# Patient Record
Sex: FEMALE | Race: WHITE | NOT HISPANIC OR LATINO | URBAN - METROPOLITAN AREA
[De-identification: names, ages, dates, MRNs, and addresses within clinical notes are randomized per-mention and may not be internally consistent; named-entity substitution may affect disease eponyms.]

---

## 2023-08-16 ENCOUNTER — EMERGENCY (EMERGENCY)
Facility: HOSPITAL | Age: 53
LOS: 1 days | Discharge: HOME | End: 2023-08-16
Attending: EMERGENCY MEDICINE
Payer: COMMERCIAL

## 2023-08-16 VITALS
DIASTOLIC BLOOD PRESSURE: 70 MMHG | RESPIRATION RATE: 18 BRPM | OXYGEN SATURATION: 97 % | HEART RATE: 59 BPM | SYSTOLIC BLOOD PRESSURE: 106 MMHG | TEMPERATURE: 98 F

## 2023-08-16 VITALS
DIASTOLIC BLOOD PRESSURE: 66 MMHG | RESPIRATION RATE: 18 BRPM | TEMPERATURE: 98 F | SYSTOLIC BLOOD PRESSURE: 112 MMHG | OXYGEN SATURATION: 96 % | WEIGHT: 164.02 LBS | HEART RATE: 72 BPM

## 2023-08-16 LAB
ALBUMIN SERPL ELPH-MCNC: 4.2 G/DL — SIGNIFICANT CHANGE UP (ref 3.3–5.2)
ALP SERPL-CCNC: 59 U/L — SIGNIFICANT CHANGE UP (ref 40–120)
ALT FLD-CCNC: 12 U/L — SIGNIFICANT CHANGE UP
ANION GAP SERPL CALC-SCNC: 12 MMOL/L — SIGNIFICANT CHANGE UP (ref 5–17)
APPEARANCE UR: ABNORMAL
AST SERPL-CCNC: 18 U/L — SIGNIFICANT CHANGE UP
BACTERIA # UR AUTO: ABNORMAL
BASOPHILS # BLD AUTO: 0.03 K/UL — SIGNIFICANT CHANGE UP (ref 0–0.2)
BASOPHILS NFR BLD AUTO: 0.2 % — SIGNIFICANT CHANGE UP (ref 0–2)
BILIRUB SERPL-MCNC: 0.4 MG/DL — SIGNIFICANT CHANGE UP (ref 0.4–2)
BILIRUB UR-MCNC: NEGATIVE — SIGNIFICANT CHANGE UP
BUN SERPL-MCNC: 17.8 MG/DL — SIGNIFICANT CHANGE UP (ref 8–20)
CALCIUM SERPL-MCNC: 9.9 MG/DL — SIGNIFICANT CHANGE UP (ref 8.4–10.5)
CHLORIDE SERPL-SCNC: 104 MMOL/L — SIGNIFICANT CHANGE UP (ref 96–108)
CO2 SERPL-SCNC: 25 MMOL/L — SIGNIFICANT CHANGE UP (ref 22–29)
COLOR SPEC: YELLOW — SIGNIFICANT CHANGE UP
CREAT SERPL-MCNC: 1.16 MG/DL — SIGNIFICANT CHANGE UP (ref 0.5–1.3)
DIFF PNL FLD: ABNORMAL
EGFR: 57 ML/MIN/1.73M2 — LOW
EOSINOPHIL # BLD AUTO: 0 K/UL — SIGNIFICANT CHANGE UP (ref 0–0.5)
EOSINOPHIL NFR BLD AUTO: 0 % — SIGNIFICANT CHANGE UP (ref 0–6)
EPI CELLS # UR: SIGNIFICANT CHANGE UP
GLUCOSE SERPL-MCNC: 122 MG/DL — HIGH (ref 70–99)
GLUCOSE UR QL: NEGATIVE MG/DL — SIGNIFICANT CHANGE UP
HCG SERPL-ACNC: <4 MIU/ML — SIGNIFICANT CHANGE UP
HCT VFR BLD CALC: 39.8 % — SIGNIFICANT CHANGE UP (ref 34.5–45)
HGB BLD-MCNC: 13.8 G/DL — SIGNIFICANT CHANGE UP (ref 11.5–15.5)
IMM GRANULOCYTES NFR BLD AUTO: 0.4 % — SIGNIFICANT CHANGE UP (ref 0–0.9)
KETONES UR-MCNC: NEGATIVE — SIGNIFICANT CHANGE UP
LEUKOCYTE ESTERASE UR-ACNC: ABNORMAL
LYMPHOCYTES # BLD AUTO: 1.6 K/UL — SIGNIFICANT CHANGE UP (ref 1–3.3)
LYMPHOCYTES # BLD AUTO: 11.4 % — LOW (ref 13–44)
MCHC RBC-ENTMCNC: 30.6 PG — SIGNIFICANT CHANGE UP (ref 27–34)
MCHC RBC-ENTMCNC: 34.7 GM/DL — SIGNIFICANT CHANGE UP (ref 32–36)
MCV RBC AUTO: 88.2 FL — SIGNIFICANT CHANGE UP (ref 80–100)
MONOCYTES # BLD AUTO: 0.51 K/UL — SIGNIFICANT CHANGE UP (ref 0–0.9)
MONOCYTES NFR BLD AUTO: 3.6 % — SIGNIFICANT CHANGE UP (ref 2–14)
NEUTROPHILS # BLD AUTO: 11.78 K/UL — HIGH (ref 1.8–7.4)
NEUTROPHILS NFR BLD AUTO: 84.4 % — HIGH (ref 43–77)
NITRITE UR-MCNC: NEGATIVE — SIGNIFICANT CHANGE UP
PH UR: 6 — SIGNIFICANT CHANGE UP (ref 5–8)
PLATELET # BLD AUTO: 268 K/UL — SIGNIFICANT CHANGE UP (ref 150–400)
POTASSIUM SERPL-MCNC: 4.5 MMOL/L — SIGNIFICANT CHANGE UP (ref 3.5–5.3)
POTASSIUM SERPL-SCNC: 4.5 MMOL/L — SIGNIFICANT CHANGE UP (ref 3.5–5.3)
PROT SERPL-MCNC: 6.5 G/DL — LOW (ref 6.6–8.7)
PROT UR-MCNC: 100
RBC # BLD: 4.51 M/UL — SIGNIFICANT CHANGE UP (ref 3.8–5.2)
RBC # FLD: 12.5 % — SIGNIFICANT CHANGE UP (ref 10.3–14.5)
RBC CASTS # UR COMP ASSIST: ABNORMAL /HPF (ref 0–4)
SODIUM SERPL-SCNC: 141 MMOL/L — SIGNIFICANT CHANGE UP (ref 135–145)
SP GR SPEC: 1.02 — SIGNIFICANT CHANGE UP (ref 1.01–1.02)
UROBILINOGEN FLD QL: 1 MG/DL
WBC # BLD: 13.98 K/UL — HIGH (ref 3.8–10.5)
WBC # FLD AUTO: 13.98 K/UL — HIGH (ref 3.8–10.5)
WBC UR QL: SIGNIFICANT CHANGE UP /HPF (ref 0–5)

## 2023-08-16 PROCEDURE — 87086 URINE CULTURE/COLONY COUNT: CPT

## 2023-08-16 PROCEDURE — 96375 TX/PRO/DX INJ NEW DRUG ADDON: CPT

## 2023-08-16 PROCEDURE — 81001 URINALYSIS AUTO W/SCOPE: CPT

## 2023-08-16 PROCEDURE — 74176 CT ABD & PELVIS W/O CONTRAST: CPT | Mod: MA

## 2023-08-16 PROCEDURE — 99284 EMERGENCY DEPT VISIT MOD MDM: CPT | Mod: 25

## 2023-08-16 PROCEDURE — 74176 CT ABD & PELVIS W/O CONTRAST: CPT | Mod: 26,MA

## 2023-08-16 PROCEDURE — 87040 BLOOD CULTURE FOR BACTERIA: CPT

## 2023-08-16 PROCEDURE — 85025 COMPLETE CBC W/AUTO DIFF WBC: CPT

## 2023-08-16 PROCEDURE — 99285 EMERGENCY DEPT VISIT HI MDM: CPT

## 2023-08-16 PROCEDURE — 84702 CHORIONIC GONADOTROPIN TEST: CPT

## 2023-08-16 PROCEDURE — 36415 COLL VENOUS BLD VENIPUNCTURE: CPT

## 2023-08-16 PROCEDURE — 96374 THER/PROPH/DIAG INJ IV PUSH: CPT

## 2023-08-16 PROCEDURE — 80053 COMPREHEN METABOLIC PANEL: CPT

## 2023-08-16 RX ORDER — ONDANSETRON 8 MG/1
1 TABLET, FILM COATED ORAL
Qty: 1 | Refills: 0
Start: 2023-08-16

## 2023-08-16 RX ORDER — SODIUM CHLORIDE 9 MG/ML
1000 INJECTION INTRAMUSCULAR; INTRAVENOUS; SUBCUTANEOUS ONCE
Refills: 0 | Status: COMPLETED | OUTPATIENT
Start: 2023-08-16 | End: 2023-08-16

## 2023-08-16 RX ORDER — CEFTRIAXONE 500 MG/1
1000 INJECTION, POWDER, FOR SOLUTION INTRAMUSCULAR; INTRAVENOUS ONCE
Refills: 0 | Status: DISCONTINUED | OUTPATIENT
Start: 2023-08-16 | End: 2023-08-16

## 2023-08-16 RX ORDER — MORPHINE SULFATE 50 MG/1
4 CAPSULE, EXTENDED RELEASE ORAL ONCE
Refills: 0 | Status: DISCONTINUED | OUTPATIENT
Start: 2023-08-16 | End: 2023-08-16

## 2023-08-16 RX ORDER — IBUPROFEN 200 MG
1 TABLET ORAL
Qty: 30 | Refills: 0
Start: 2023-08-16

## 2023-08-16 RX ORDER — KETOROLAC TROMETHAMINE 30 MG/ML
15 SYRINGE (ML) INJECTION ONCE
Refills: 0 | Status: DISCONTINUED | OUTPATIENT
Start: 2023-08-16 | End: 2023-08-16

## 2023-08-16 RX ORDER — ONDANSETRON 8 MG/1
4 TABLET, FILM COATED ORAL ONCE
Refills: 0 | Status: COMPLETED | OUTPATIENT
Start: 2023-08-16 | End: 2023-08-16

## 2023-08-16 RX ORDER — CEFPODOXIME PROXETIL 100 MG
1 TABLET ORAL
Qty: 10 | Refills: 0
Start: 2023-08-16

## 2023-08-16 RX ORDER — OXYCODONE AND ACETAMINOPHEN 5; 325 MG/1; MG/1
1 TABLET ORAL
Qty: 15 | Refills: 0
Start: 2023-08-16

## 2023-08-16 RX ORDER — TAMSULOSIN HYDROCHLORIDE 0.4 MG/1
1 CAPSULE ORAL
Qty: 7 | Refills: 0
Start: 2023-08-16

## 2023-08-16 RX ORDER — CEFTRIAXONE 500 MG/1
1000 INJECTION, POWDER, FOR SOLUTION INTRAMUSCULAR; INTRAVENOUS ONCE
Refills: 0 | Status: DISCONTINUED | OUTPATIENT
Start: 2023-08-16 | End: 2023-08-23

## 2023-08-16 RX ADMIN — SODIUM CHLORIDE 1000 MILLILITER(S): 9 INJECTION INTRAMUSCULAR; INTRAVENOUS; SUBCUTANEOUS at 08:46

## 2023-08-16 RX ADMIN — SODIUM CHLORIDE 1000 MILLILITER(S): 9 INJECTION INTRAMUSCULAR; INTRAVENOUS; SUBCUTANEOUS at 05:49

## 2023-08-16 RX ADMIN — ONDANSETRON 4 MILLIGRAM(S): 8 TABLET, FILM COATED ORAL at 05:48

## 2023-08-16 RX ADMIN — MORPHINE SULFATE 4 MILLIGRAM(S): 50 CAPSULE, EXTENDED RELEASE ORAL at 05:50

## 2023-08-16 RX ADMIN — Medication 15 MILLIGRAM(S): at 06:44

## 2023-08-16 RX ADMIN — Medication 15 MILLIGRAM(S): at 07:10

## 2023-08-16 NOTE — ED PROVIDER NOTE - PROGRESS NOTE DETAILS
MD Rigoberto:  Received signout from Dr. Peters pending CT. found to have 7mm stone with moderate hydro, wbc 13, afebrile,  BP in high 90s/60s.  given ivf and abx. ua with only few WBCs and large RBCs.  moderate bacteria.  spoke with urology regarding case and they state patient may be discharged home with strict return precautions. offered obs for patient, but she prefers trial of outpatient therapy. will dc with abx and pain control.

## 2023-08-16 NOTE — ED PROVIDER NOTE - PATIENT PORTAL LINK FT
You can access the FollowMyHealth Patient Portal offered by [facility] by registering at the following website: [email or web address]. By joining [facility]’s FollowMyHealth portal, you will also be able to view your health information using other applications (apps) compatible with our system.

## 2023-08-16 NOTE — ED ADULT NURSE NOTE - NS ED NOTE  TALK SOMEONE YN
56F with PMH including HTN p/w headaches s/p minor head trauma yesterday. No LOC or any other injuries or symptoms. Pt well appearing, neuro intact, no external signs of trauma. Low suspicion for serious intracranial trauma but will screen with CTH and reassess after Tylenol.
No

## 2023-08-16 NOTE — ED ADULT NURSE NOTE - OBJECTIVE STATEMENT
pt to ED with complaints of left flank pain. denies pmh. pt states around 2200 she developed sudden onset of left flank pain after taking two flights last night. pt states pain is sharp 10/10 in left flank and radiates to her abdomen. pt with multiple episodes on non bloody emesis. denies diarrhea. pt states she took tylenol at home PTA. pt denies any other urinary symptoms, sob, cp. pt presents A+O x4. RR even and unlabored.

## 2023-08-16 NOTE — ED ADULT NURSE REASSESSMENT NOTE - NS ED NURSE REASSESS COMMENT FT1
assumed care of pt from previous RN LP. Pt a&ox3, denies any pain or discomfort at this time. Pt states + relief from pain medication and states she is able to sleep now. VSS as charted, RR wnl. awaiting CT at this time, bed locked in lowest position, safety maintained.

## 2023-08-16 NOTE — ED ADULT TRIAGE NOTE - CHIEF COMPLAINT QUOTE
c/o of left side abd pain that goes to left lower back since 10 pm last night. c/o pain, N/V, Pt was here earlier but left right after being triage and just woke up in severe pain. Pt was on a plane yesterday coming back from [place]. Took 2 extra strength tylenol but no relief.

## 2023-08-16 NOTE — ED PROVIDER NOTE - NS ED MD DISPO DISCHARGE CCDA
Pathology report reviewed with breast radiologist Scott. I phoned and informed patient of results showing benign complex fibroadenoma. Recommended follow up is routine screening.   Patient states no problems with biopsy site. Questions were answered and my phone number given if she has further questions or concerns.  
Patient/Caregiver provided printed discharge information.

## 2023-08-16 NOTE — ED PROVIDER NOTE - PHYSICAL EXAMINATION
General: In NAD, non-toxic/well-appearing.  Skin: No rashes or lesions. Warm, dry, color normal for race.   Head: Normocephalic/atraumatic.   Eyes: Sclera anicteric, conjunctivae clear b/l. PERRLA, EOMI.   Neck: Supple, FROM.   Cardio: Rate and rhythm regular. No audible murmur.  Resp: Breath sounds vesicular, symmetrical and without rales, rhonchi or wheezing b/l.  Abd: Non-distended. Soft, non-tender. No rebound, +Left sided CVAT.   MSK: MAEx4. FROM.   Neuro: A&Ox3. Appears nonfocal. General: In NAD, non-toxic/well-appearing.  Skin: No rashes or lesions. Warm, dry, color normal for race.   Head: Normocephalic/atraumatic.   Eyes: Sclera anicteric, conjunctivae clear b/l. PERRLA, EOMI.   Neck: Supple, FROM.   Cardio: Rate and rhythm regular. No audible murmur.  Resp: Breath sounds vesicular, symmetrical and without rales, rhonchi or wheezing b/l.  Abd: Non-distended. Soft, non-tender. No rebound, or guarding. +Left sided CVAT.   MSK: MAEx4. FROM.   Neuro: A&Ox3. Appears nonfocal. Normal gait.

## 2023-08-16 NOTE — ED PROVIDER NOTE - ATTENDING APP SHARED VISIT CONTRIBUTION OF CARE
I agree with the PA's note and was available for any issues/concerns. I was directly involved in patient care. My brief overall assessment is as follows:     Pt with flank pain, labs and urine consistent with likely nephrolithiasis. no infectious sx. no UTI. CT pending     Patient signed out to incoming physician pending results of testing and reassessment.  All decisions regarding the progression of care will be made at their discretion.

## 2023-08-16 NOTE — ED PROVIDER NOTE - CARE PROVIDER_API CALL
Roberth [facility]keon [facility]  Urology  [address] [place] [id number]  Phone: [phone]  Fax: [phone]  Follow Up Time:

## 2023-08-16 NOTE — ED PROVIDER NOTE - OBJECTIVE STATEMENT
51 yo female no PMHx presents to ED c/o abdominal pain. Pain intermittent to left lower side and flank. Began while on airplane yesterday evening. Medicated with acetaminophen 4 hours ago. Presented to ED several hours ago, felt better and left prior to triage. Woke up again with severe pain. +Nausea without vomiting. Had two episodes of hematuria last week. No further complaints at this time.  Denies h/o kidney stones, fevers, chest pain, SOB.

## 2023-08-16 NOTE — ED PROVIDER NOTE - NSFOLLOWUPINSTRUCTIONS_ED_ALL_ED_FT
- Prescription sent to pharmacy.  - Ibuprofen 600mg every 6 hours as needed for pain.  - Acetaminophen 650mg every 6 hours as needed for pain.   - Increase fluids.   - Please call tomorrow to schedule follow up appointment with urologist.  - Please bring all documentation from your ED visit to any related future follow up appointment.  - Please call to schedule follow up appointment with your primary care physician within 24-48 hours.  - Please seek immediate medical attention for any new/worsening, signs/symptoms, or concerns including but not limited to fever, intractable pain/vomiting.    Feel better!       Kidney Stones       Kidney stones are solid, rock-like deposits that form inside of the kidneys. The kidneys are a pair of organs that make urine. A kidney stone may form in a kidney and move into other parts of the urinary tract, including the tubes that connect the kidneys to the bladder (ureters), the bladder, and the tube that carries urine out of the body (urethra). As the stone moves through these areas, it can cause intense pain and block the flow of urine.    Kidney stones are created when high levels of certain minerals are found in the urine. The stones are usually passed out of the body through urination, but in some cases, medical treatment may be needed to remove them.      What are the causes?  Kidney stones may be caused by:  •A condition in which certain glands produce too much parathyroid hormone (primary hyperparathyroidism), which causes too much calcium buildup in the blood.      •A buildup of uric acid crystals in the bladder (hyperuricosuria). Uric acid is a chemical that the body produces when you eat certain foods. It usually exits the body in the urine.      •Narrowing (stricture) of one or both of the ureters.      •A kidney blockage that is present at birth (congenital obstruction).      •Past surgery on the kidney or the ureters, such as gastric bypass surgery.        What increases the risk?  The following factors may make you more likely to develop this condition:  •Having had a kidney stone in the past.      •Having a family history of kidney stones.      •Not drinking enough water.      •Eating a diet that is high in protein, salt (sodium), or sugar.      •Being overweight or obese.        What are the signs or symptoms?  Symptoms of a kidney stone may include:  •Pain in the side of the abdomen, right below the ribs (flank pain). Pain usually spreads (radiates) to the groin.      •Needing to urinate frequently or urgently.      •Painful urination.      •Blood in the urine (hematuria).      •Nausea.      •Vomiting.      •Fever and chills.        How is this diagnosed?  This condition may be diagnosed based on:  •Your symptoms and medical history.      •A physical exam.      •Blood tests.      •Urine tests. These may be done before and after the stone passes out of your body through urination.      •Imaging tests, such as a CT scan, abdominal X-ray, or ultrasound.      •A procedure to examine the inside of the bladder (cystoscopy).        How is this treated?  Treatment for kidney stones depends on the size, location, and makeup of the stones. Kidney stones will often pass out of the body through urination. You may need to:  •Increase your fluid intake to help pass the stone. In some cases, you may be given fluids through an IV and may need to be monitored at the hospital.      •Take medicine for pain.      •Make changes in your diet to help prevent kidney stones from coming back.    Sometimes, medical procedures are needed to remove a kidney stone. This may involve:•A procedure to break up kidney stones using:  •A focused beam of light (laser therapy).      •Shock waves (extracorporeal shock wave lithotripsy).        •Surgery to remove kidney stones. This may be needed if you have severe pain or have stones that block your urinary tract.        Follow these instructions at home:    Medicines     •Take over-the-counter and prescription medicines only as told by your health care provider.      •Ask your health care provider if the medicine prescribed to you requires you to avoid driving or using heavy machinery.      Eating and drinking     •Drink enough fluid to keep your urine pale yellow. You may be instructed to drink at least 8–10 glasses of water each day. This will help you pass the kidney stone.    •If directed, change your diet. This may include:  •Limiting how much sodium you eat.      •Eating more fruits and vegetables.      •Limiting how much animal protein—such as red meat, poultry, fish, and eggs—you eat.        •Follow instructions from your health care provider about eating or drinking restrictions.      General instructions   •Collect urine samples as told by your health care provider. You may need to collect a urine sample:  •24 hours after you pass the stone.      •8–12 weeks after passing the kidney stone, and every 6–12 months after that.        •Strain your urine every time you urinate, for as long as directed. Use the strainer that your health care provider recommends.      • Do not throw out the kidney stone after passing it. Keep the stone so it can be tested by your health care provider. Testing the makeup of your kidney stone may help prevent you from getting kidney stones in the future.      •Keep all follow-up visits as told by your health care provider. This is important. You may need follow-up X-rays or ultrasounds to make sure that your stone has passed.        How is this prevented?  To prevent another kidney stone:  •Drink enough fluid to keep your urine pale yellow. This is the best way to prevent kidney stones.      •Eat a healthy diet and follow recommendations from your health care provider about foods to avoid. You may be instructed to eat a low-protein diet. Recommendations vary depending on the type of kidney stone that you have.      •Maintain a healthy weight.        Where to find more information    •National Kidney Foundation (NKF): www.kidney.org      •[employer] (UCF): www.urologyhealth.org        Contact a health care provider if:    •You have pain that gets worse or does not get better with medicine.        Get help right away if:    •You have a fever or chills.      •You develop severe pain.      •You develop new abdominal pain.      •You faint.      •You are unable to urinate.        Summary    •Kidney stones are solid, rock-like deposits that form inside of the kidneys.      •Kidney stones can cause nausea, vomiting, blood in the urine, abdominal pain, and the urge to urinate frequently.      •Treatment for kidney stones depends on the size, location, and makeup of the stones. Kidney stones will often pass out of the body through urination.      •Kidney stones can be prevented by drinking enough fluids, eating a healthy diet, and maintaining a healthy weight.      This information is not intended to replace advice given to you by your health care provider. Make sure you discuss any questions you have with your health care provider. - Prescription sent to pharmacy.  - Ibuprofen 600mg every 6 hours as needed for pain.  - Acetaminophen 650mg every 6 hours as needed for pain.   - Increase fluids.   - Please call tomorrow to schedule follow up appointment with urologist.  - Please bring all documentation from your ED visit to any related future follow up appointment.  - Please call to schedule follow up appointment with your primary care physician within 24-48 hours.  - Please seek immediate medical attention for any new/worsening, signs/symptoms, or concerns including but not limited to fever, intractable pain/vomiting.        Kidney Stones       Kidney stones are solid, rock-like deposits that form inside of the kidneys. The kidneys are a pair of organs that make urine. A kidney stone may form in a kidney and move into other parts of the urinary tract, including the tubes that connect the kidneys to the bladder (ureters), the bladder, and the tube that carries urine out of the body (urethra). As the stone moves through these areas, it can cause intense pain and block the flow of urine.    Kidney stones are created when high levels of certain minerals are found in the urine. The stones are usually passed out of the body through urination, but in some cases, medical treatment may be needed to remove them.      What are the causes?  Kidney stones may be caused by:  •A condition in which certain glands produce too much parathyroid hormone (primary hyperparathyroidism), which causes too much calcium buildup in the blood.      •A buildup of uric acid crystals in the bladder (hyperuricosuria). Uric acid is a chemical that the body produces when you eat certain foods. It usually exits the body in the urine.      •Narrowing (stricture) of one or both of the ureters.      •A kidney blockage that is present at birth (congenital obstruction).      •Past surgery on the kidney or the ureters, such as gastric bypass surgery.        What increases the risk?  The following factors may make you more likely to develop this condition:  •Having had a kidney stone in the past.      •Having a family history of kidney stones.      •Not drinking enough water.      •Eating a diet that is high in protein, salt (sodium), or sugar.      •Being overweight or obese.        What are the signs or symptoms?  Symptoms of a kidney stone may include:  •Pain in the side of the abdomen, right below the ribs (flank pain). Pain usually spreads (radiates) to the groin.      •Needing to urinate frequently or urgently.      •Painful urination.      •Blood in the urine (hematuria).      •Nausea.      •Vomiting.      •Fever and chills.        How is this diagnosed?  This condition may be diagnosed based on:  •Your symptoms and medical history.      •A physical exam.      •Blood tests.      •Urine tests. These may be done before and after the stone passes out of your body through urination.      •Imaging tests, such as a CT scan, abdominal X-ray, or ultrasound.      •A procedure to examine the inside of the bladder (cystoscopy).        How is this treated?  Treatment for kidney stones depends on the size, location, and makeup of the stones. Kidney stones will often pass out of the body through urination. You may need to:  •Increase your fluid intake to help pass the stone. In some cases, you may be given fluids through an IV and may need to be monitored at the hospital.      •Take medicine for pain.      •Make changes in your diet to help prevent kidney stones from coming back.    Sometimes, medical procedures are needed to remove a kidney stone. This may involve:•A procedure to break up kidney stones using:  •A focused beam of light (laser therapy).      •Shock waves (extracorporeal shock wave lithotripsy).        •Surgery to remove kidney stones. This may be needed if you have severe pain or have stones that block your urinary tract.        Follow these instructions at home:    Medicines     •Take over-the-counter and prescription medicines only as told by your health care provider.      •Ask your health care provider if the medicine prescribed to you requires you to avoid driving or using heavy machinery.      Eating and drinking     •Drink enough fluid to keep your urine pale yellow. You may be instructed to drink at least 8–10 glasses of water each day. This will help you pass the kidney stone.    •If directed, change your diet. This may include:  •Limiting how much sodium you eat.      •Eating more fruits and vegetables.      •Limiting how much animal protein—such as red meat, poultry, fish, and eggs—you eat.        •Follow instructions from your health care provider about eating or drinking restrictions.      General instructions   •Collect urine samples as told by your health care provider. You may need to collect a urine sample:  •24 hours after you pass the stone.      •8–12 weeks after passing the kidney stone, and every 6–12 months after that.        •Strain your urine every time you urinate, for as long as directed. Use the strainer that your health care provider recommends.      • Do not throw out the kidney stone after passing it. Keep the stone so it can be tested by your health care provider. Testing the makeup of your kidney stone may help prevent you from getting kidney stones in the future.      •Keep all follow-up visits as told by your health care provider. This is important. You may need follow-up X-rays or ultrasounds to make sure that your stone has passed.        How is this prevented?  To prevent another kidney stone:  •Drink enough fluid to keep your urine pale yellow. This is the best way to prevent kidney stones.      •Eat a healthy diet and follow recommendations from your health care provider about foods to avoid. You may be instructed to eat a low-protein diet. Recommendations vary depending on the type of kidney stone that you have.      •Maintain a healthy weight.        Where to find more information    •National Kidney Foundation (NKF): www.kidney.org      •[employer] (UCF): www.urologyhealth.org        Contact a health care provider if:    •You have pain that gets worse or does not get better with medicine.        Get help right away if:    •You have a fever or chills.      •You develop severe pain.      •You develop new abdominal pain.      •You faint.      •You are unable to urinate.        Summary    •Kidney stones are solid, rock-like deposits that form inside of the kidneys.      •Kidney stones can cause nausea, vomiting, blood in the urine, abdominal pain, and the urge to urinate frequently.      •Treatment for kidney stones depends on the size, location, and makeup of the stones. Kidney stones will often pass out of the body through urination.      •Kidney stones can be prevented by drinking enough fluids, eating a healthy diet, and maintaining a healthy weight.      This information is not intended to replace advice given to you by your health care provider. Make sure you discuss any questions you have with your health care provider.

## 2023-08-16 NOTE — ED ADULT NURSE NOTE - NSFALLRISKINTERV_ED_ALL_ED

## 2023-08-16 NOTE — ED PROVIDER NOTE - CLINICAL SUMMARY MEDICAL DECISION MAKING FREE TEXT BOX
53 yo female no PMHx presents to ED c/o intermittent left sided flank pain, nausea today and hematuria last week. Concerning for kidney stone. Mild leukocytosis, hematuria. Patient signed out to AM team pending CT. 51 yo female no PMHx presents to ED c/o intermittent left sided flank pain, nausea today and hematuria last week. Concerning for kidney stone. Mild leukocytosis, hematuria. Patient signed out to AM team pending CT.      Clinical Summary:  On CT, patient found to have moderate left-sided hydroureteronephrosis secondary to 7 mm calculus at   the UVJ.  +bacteria on UA with no nitrites and few wbc. will place on abx.  offered observation stay, but patient prefers to trial outpatient meds.  will dc with strict return precautions.

## 2023-08-17 LAB
CULTURE RESULTS: SIGNIFICANT CHANGE UP
SPECIMEN SOURCE: SIGNIFICANT CHANGE UP

## 2023-08-21 LAB
CULTURE RESULTS: SIGNIFICANT CHANGE UP
CULTURE RESULTS: SIGNIFICANT CHANGE UP
SPECIMEN SOURCE: SIGNIFICANT CHANGE UP
SPECIMEN SOURCE: SIGNIFICANT CHANGE UP

## 2023-08-22 PROBLEM — Z00.00 ENCOUNTER FOR PREVENTIVE HEALTH EXAMINATION: Status: ACTIVE | Noted: 2023-08-22

## 2023-08-23 ENCOUNTER — APPOINTMENT (OUTPATIENT)
Dept: UROLOGY | Facility: CLINIC | Age: 53
End: 2023-08-23
Payer: COMMERCIAL

## 2023-08-23 ENCOUNTER — TRANSCRIPTION ENCOUNTER (OUTPATIENT)
Age: 53
End: 2023-08-23

## 2023-08-23 VITALS
OXYGEN SATURATION: 97 % | RESPIRATION RATE: 16 BRPM | BODY MASS INDEX: 25.39 KG/M2 | HEIGHT: 66 IN | DIASTOLIC BLOOD PRESSURE: 70 MMHG | WEIGHT: 158 LBS | TEMPERATURE: 97.7 F | HEART RATE: 73 BPM | SYSTOLIC BLOOD PRESSURE: 114 MMHG

## 2023-08-23 DIAGNOSIS — Z82.49 FAMILY HISTORY OF ISCHEMIC HEART DISEASE AND OTHER DISEASES OF THE CIRCULATORY SYSTEM: ICD-10-CM

## 2023-08-23 DIAGNOSIS — N20.1 CALCULUS OF URETER: ICD-10-CM

## 2023-08-23 DIAGNOSIS — Z87.442 PERSONAL HISTORY OF URINARY CALCULI: ICD-10-CM

## 2023-08-23 PROCEDURE — 99203 OFFICE O/P NEW LOW 30 MIN: CPT

## 2023-08-23 NOTE — PHYSICAL EXAM
[General Appearance - Well Developed] : well developed [General Appearance - Well Nourished] : well nourished [Normal Appearance] : normal appearance [Well Groomed] : well groomed [General Appearance - In No Acute Distress] : no acute distress [Edema] : no peripheral edema [Respiration, Rhythm And Depth] : normal respiratory rhythm and effort [Exaggerated Use Of Accessory Muscles For Inspiration] : no accessory muscle use [Abdomen Soft] : soft [Costovertebral Angle Tenderness] : no ~M costovertebral angle tenderness [Abdomen Tenderness] : non-tender [Normal Station and Gait] : the gait and station were normal for the patient's age [] : no rash [No Focal Deficits] : no focal deficits [Oriented To Time, Place, And Person] : oriented to person, place, and time [Affect] : the affect was normal [Mood] : the mood was normal [Not Anxious] : not anxious

## 2023-08-23 NOTE — ASSESSMENT
[FreeTextEntry1] : 52F w no significant PMH presents for nephrolithiasis. She had a 7mm L UVJ stone that has likely passed. We had a long discussion about management options. She is flying to [place] in 5 days and wants to make certain the stone has passed before she goes.  -Low-dose prone pelvic CT today or tomorrow -Telephone visit 8/25/23 to review results.

## 2023-08-23 NOTE — HISTORY OF PRESENT ILLNESS
[FreeTextEntry1] : 52F w no significant PMH presents for nephrolithiasis.   2021, bladder sling for PRISCILLA was successful.  8/16/23, CT showed 7mm L UVJ stone. Cr 1.16. UCx negative. Discharged for trial of passage w Flomax + Abx.  Today, she reports that she has not had any pain since leaving the ED, but she has not seen the stone. This was her first stone. She does have a strong FMH of stones.

## 2023-08-24 ENCOUNTER — OUTPATIENT (OUTPATIENT)
Dept: OUTPATIENT SERVICES | Facility: HOSPITAL | Age: 53
LOS: 1 days | End: 2023-08-24
Payer: COMMERCIAL

## 2023-08-24 ENCOUNTER — APPOINTMENT (OUTPATIENT)
Dept: CT IMAGING | Facility: CLINIC | Age: 53
End: 2023-08-24
Payer: COMMERCIAL

## 2023-08-24 DIAGNOSIS — N20.1 CALCULUS OF URETER: ICD-10-CM

## 2023-08-24 PROCEDURE — 72192 CT PELVIS W/O DYE: CPT

## 2023-08-24 PROCEDURE — 72192 CT PELVIS W/O DYE: CPT | Mod: 26

## 2023-08-25 ENCOUNTER — APPOINTMENT (OUTPATIENT)
Dept: UROLOGY | Facility: CLINIC | Age: 53
End: 2023-08-25
Payer: COMMERCIAL

## 2023-08-25 PROCEDURE — 99443: CPT

## 2023-08-25 RX ORDER — TAMSULOSIN HYDROCHLORIDE 0.4 MG/1
0.4 CAPSULE ORAL
Qty: 30 | Refills: 0 | Status: ACTIVE | COMMUNITY
Start: 2023-08-25 | End: 1900-01-01

## 2023-08-25 NOTE — ASSESSMENT
[FreeTextEntry1] : 52F w no significant PMH presents for nephrolithiasis. She has a 7mm L UVJ stone that has not passed after ~10 days. We had a long discussion about management options, including continued trial of passage. Given her busy travel schedule, she would like to have a surgery scheduled.  -Flomax daily -Increase fluid intake -Low-dose prone CT 9/25/23 -Telephone visit 9/27/23 -Posted for L URS/LL 9/28/23

## 2023-08-25 NOTE — HISTORY OF PRESENT ILLNESS
[FreeTextEntry1] : 52F w no significant PMH presents for nephrolithiasis.  2021, bladder sling for PRISCILLA was successful.  8/16/23, CT showed 7mm L UVJ stone. Cr 1.16. UCx negative. Discharged for trial of passage w Flomax + Abx.  Yesterday, CT showed stone still in L UVJ.  Today, she reports that she has not had any pain since leaving the ED, but she has not seen the stone. This was her first stone. She does have a strong FMH of stones.

## 2023-09-08 ENCOUNTER — OUTPATIENT (OUTPATIENT)
Dept: OUTPATIENT SERVICES | Facility: HOSPITAL | Age: 53
LOS: 1 days | End: 2023-09-08
Payer: COMMERCIAL

## 2023-09-08 VITALS
SYSTOLIC BLOOD PRESSURE: 120 MMHG | HEIGHT: 66 IN | DIASTOLIC BLOOD PRESSURE: 60 MMHG | TEMPERATURE: 97 F | RESPIRATION RATE: 20 BRPM | HEART RATE: 64 BPM | OXYGEN SATURATION: 98 % | WEIGHT: 159.17 LBS

## 2023-09-08 DIAGNOSIS — Z29.9 ENCOUNTER FOR PROPHYLACTIC MEASURES, UNSPECIFIED: ICD-10-CM

## 2023-09-08 DIAGNOSIS — Z98.890 OTHER SPECIFIED POSTPROCEDURAL STATES: Chronic | ICD-10-CM

## 2023-09-08 DIAGNOSIS — N20.1 CALCULUS OF URETER: ICD-10-CM

## 2023-09-08 DIAGNOSIS — Z01.818 ENCOUNTER FOR OTHER PREPROCEDURAL EXAMINATION: ICD-10-CM

## 2023-09-08 LAB
A1C WITH ESTIMATED AVERAGE GLUCOSE RESULT: 5.6 % — SIGNIFICANT CHANGE UP (ref 4–5.6)
ANION GAP SERPL CALC-SCNC: 13 MMOL/L — SIGNIFICANT CHANGE UP (ref 5–17)
APPEARANCE UR: CLEAR — SIGNIFICANT CHANGE UP
APTT BLD: 28.5 SEC — SIGNIFICANT CHANGE UP (ref 24.5–35.6)
BACTERIA # UR AUTO: NEGATIVE — SIGNIFICANT CHANGE UP
BASOPHILS # BLD AUTO: 0.03 K/UL — SIGNIFICANT CHANGE UP (ref 0–0.2)
BASOPHILS NFR BLD AUTO: 0.4 % — SIGNIFICANT CHANGE UP (ref 0–2)
BILIRUB UR-MCNC: NEGATIVE — SIGNIFICANT CHANGE UP
BLD GP AB SCN SERPL QL: SIGNIFICANT CHANGE UP
BUN SERPL-MCNC: 18.7 MG/DL — SIGNIFICANT CHANGE UP (ref 8–20)
CALCIUM SERPL-MCNC: 9.5 MG/DL — SIGNIFICANT CHANGE UP (ref 8.4–10.5)
CHLORIDE SERPL-SCNC: 100 MMOL/L — SIGNIFICANT CHANGE UP (ref 96–108)
CO2 SERPL-SCNC: 25 MMOL/L — SIGNIFICANT CHANGE UP (ref 22–29)
COLOR SPEC: YELLOW — SIGNIFICANT CHANGE UP
CREAT SERPL-MCNC: 1.04 MG/DL — SIGNIFICANT CHANGE UP (ref 0.5–1.3)
DIFF PNL FLD: ABNORMAL
EGFR: 65 ML/MIN/1.73M2 — SIGNIFICANT CHANGE UP
EOSINOPHIL # BLD AUTO: 0.14 K/UL — SIGNIFICANT CHANGE UP (ref 0–0.5)
EOSINOPHIL NFR BLD AUTO: 1.9 % — SIGNIFICANT CHANGE UP (ref 0–6)
ESTIMATED AVERAGE GLUCOSE: 114 MG/DL — SIGNIFICANT CHANGE UP (ref 68–114)
GLUCOSE SERPL-MCNC: 104 MG/DL — HIGH (ref 70–99)
GLUCOSE UR QL: NEGATIVE MG/DL — SIGNIFICANT CHANGE UP
HCG SERPL-ACNC: <4 MIU/ML — SIGNIFICANT CHANGE UP
HCT VFR BLD CALC: 41.4 % — SIGNIFICANT CHANGE UP (ref 34.5–45)
HGB BLD-MCNC: 13.9 G/DL — SIGNIFICANT CHANGE UP (ref 11.5–15.5)
IMM GRANULOCYTES NFR BLD AUTO: 0.3 % — SIGNIFICANT CHANGE UP (ref 0–0.9)
INR BLD: 1.06 RATIO — SIGNIFICANT CHANGE UP (ref 0.85–1.18)
KETONES UR-MCNC: NEGATIVE — SIGNIFICANT CHANGE UP
LEUKOCYTE ESTERASE UR-ACNC: ABNORMAL
LYMPHOCYTES # BLD AUTO: 2.72 K/UL — SIGNIFICANT CHANGE UP (ref 1–3.3)
LYMPHOCYTES # BLD AUTO: 37.5 % — SIGNIFICANT CHANGE UP (ref 13–44)
MCHC RBC-ENTMCNC: 30 PG — SIGNIFICANT CHANGE UP (ref 27–34)
MCHC RBC-ENTMCNC: 33.6 GM/DL — SIGNIFICANT CHANGE UP (ref 32–36)
MCV RBC AUTO: 89.2 FL — SIGNIFICANT CHANGE UP (ref 80–100)
MONOCYTES # BLD AUTO: 0.45 K/UL — SIGNIFICANT CHANGE UP (ref 0–0.9)
MONOCYTES NFR BLD AUTO: 6.2 % — SIGNIFICANT CHANGE UP (ref 2–14)
NEUTROPHILS # BLD AUTO: 3.9 K/UL — SIGNIFICANT CHANGE UP (ref 1.8–7.4)
NEUTROPHILS NFR BLD AUTO: 53.7 % — SIGNIFICANT CHANGE UP (ref 43–77)
NITRITE UR-MCNC: NEGATIVE — SIGNIFICANT CHANGE UP
PH UR: 6 — SIGNIFICANT CHANGE UP (ref 5–8)
PLATELET # BLD AUTO: 324 K/UL — SIGNIFICANT CHANGE UP (ref 150–400)
POTASSIUM SERPL-MCNC: 4 MMOL/L — SIGNIFICANT CHANGE UP (ref 3.5–5.3)
POTASSIUM SERPL-SCNC: 4 MMOL/L — SIGNIFICANT CHANGE UP (ref 3.5–5.3)
PROT UR-MCNC: NEGATIVE — SIGNIFICANT CHANGE UP
PROTHROM AB SERPL-ACNC: 11.7 SEC — SIGNIFICANT CHANGE UP (ref 9.5–13)
RBC # BLD: 4.64 M/UL — SIGNIFICANT CHANGE UP (ref 3.8–5.2)
RBC # FLD: 12.6 % — SIGNIFICANT CHANGE UP (ref 10.3–14.5)
RBC CASTS # UR COMP ASSIST: SIGNIFICANT CHANGE UP /HPF (ref 0–4)
SODIUM SERPL-SCNC: 138 MMOL/L — SIGNIFICANT CHANGE UP (ref 135–145)
SP GR SPEC: 1.01 — SIGNIFICANT CHANGE UP (ref 1.01–1.02)
UROBILINOGEN FLD QL: NEGATIVE MG/DL — SIGNIFICANT CHANGE UP
WBC # BLD: 7.26 K/UL — SIGNIFICANT CHANGE UP (ref 3.8–10.5)
WBC # FLD AUTO: 7.26 K/UL — SIGNIFICANT CHANGE UP (ref 3.8–10.5)
WBC UR QL: SIGNIFICANT CHANGE UP /HPF (ref 0–5)

## 2023-09-08 PROCEDURE — 93010 ELECTROCARDIOGRAM REPORT: CPT

## 2023-09-08 PROCEDURE — G0463: CPT

## 2023-09-08 PROCEDURE — 93005 ELECTROCARDIOGRAM TRACING: CPT

## 2023-09-08 NOTE — H&P PST ADULT - NSICDXFAMILYHX_GEN_ALL_CORE_FT
FAMILY HISTORY:  Father  Still living? Yes, Estimated age: Age Unknown  FH: type 2 diabetes mellitus, Age at diagnosis: Age Unknown    Mother  Still living? No  FH: CAD (coronary artery disease), Age at diagnosis: Age Unknown

## 2023-09-08 NOTE — H&P PST ADULT - NSHP PST DIAGOTHER LIST_GEN_A_CORE
Color consistent with ethnicity/race, warm, dry intact, resilient. 9/11/23 18:59 Urine culture emailed to surgeon. Jacqueline MS, FNP-BC

## 2023-09-08 NOTE — H&P PST ADULT - PROBLEM SELECTOR PLAN 1
Left Ureteroscopy, Laser Lithotripsy and Possible Ureteral Stent    Pt instructed to stop vitamins/supplements/herbal medications/ASA/NSAIDS for one week prior to surgery and discuss with PMD.  Patient educated on surgical scrub, preadmission instructions, medical clearance and day of procedure medications, verbalizes understanding. Left Ureteroscopy, Laser Lithotripsy and Possible Ureteral Stent    Pt instructed to stop vitamins/supplements/herbal medications/ASA/NSAIDS for one week prior to surgery and discuss with PMD.  Patient educated on preadmission instructions, medical clearance and day of procedure medications, verbalizes understanding.  Nothing to eat or drink after midnight the night before surgery or the morning of surgery. Patient verbally expressed understanding  Patient states she does not have a PCP.

## 2023-09-08 NOTE — H&P PST ADULT - NSANTHOSAYNRD_GEN_A_CORE
No. ALBINA screening performed.  STOP BANG Legend: 0-2 = LOW Risk; 3-4 = INTERMEDIATE Risk; 5-8 = HIGH Risk

## 2023-09-08 NOTE — H&P PST ADULT - ASSESSMENT
CAPRINI SCORE    AGE RELATED RISK FACTORS                                                             [ ] Age 41-60 years                                            (1 Point)  [ ] Age: 61-74 years                                           (2 Points)                 [ ] Age= 75 years                                                (3 Points)             DISEASE RELATED RISK FACTORS                                                       [ ] Edema in the lower extremities                 (1 Point)                     [ ] Varicose veins                                               (1 Point)                                 [ ] BMI > 25 Kg/m2                                            (1 Point)                                  [ ] Serious infection (ie PNA)                            (1 Point)                     [ ] Lung disease ( COPD, Emphysema)            (1 Point)                                                                          [ ] Acute myocardial infarction                         (1 Point)                  [ ] Congestive heart failure (in the previous month)  (1 Point)         [ ] Inflammatory bowel disease                            (1 Point)                  [ ] Central venous access, PICC or Port               (2 points)       (within the last month)                                                                [ ] Stroke (in the previous month)                        (5 Points)    [ ] Previous or present malignancy                       (2 points)                                                                                                                                                         HEMATOLOGY RELATED FACTORS                                                         [ ] Prior episodes of VTE                                     (3 Points)                     [ ] Positive family history for VTE                      (3 Points)                  [ ] Prothrombin 53511 A                                     (3 Points)                     [ ] Factor V Leiden                                                (3 Points)                        [ ] Lupus anticoagulants                                      (3 Points)                                                           [ ] Anticardiolipin antibodies                              (3 Points)                                                       [ ] High homocysteine in the blood                   (3 Points)                                             [ ] Other congenital or acquired thrombophilia      (3 Points)                                                [ ] Heparin induced thrombocytopenia                  (3 Points)                                        MOBILITY RELATED FACTORS  [ ] Bed rest                                                         (1 Point)  [ ] Plaster cast                                                    (2 points)  [ ] Bed bound for more than 72 hours           (2 Points)    GENDER SPECIFIC FACTORS  [ ] Pregnancy or had a baby within the last month   (1 Point)  [ ] Post-partum < 6 weeks                                   (1 Point)  [ ] Hormonal therapy  or oral contraception   (1 Point)  [ ] History of pregnancy complications              (1 point)  [ ] Unexplained or recurrent              (1 Point)    OTHER RISK FACTORS                                           (1 Point)  [ ] BMI >40, smoking, diabetes requiring insulin, chemotherapy  blood transfusions and length of surgery over 2 hours    SURGERY RELATED RISK FACTORS  [ ]  Section within the last month     (1 Point)  [ ] Minor surgery                                                  (1 Point)  [ ] Arthroscopic surgery                                       (2 Points)  [ ] Planned major surgery lasting more            (2 Points)      than 45 minutes     [ ] Elective hip or knee joint replacement       (5 points)       surgery                                                TRAUMA RELATED RISK FACTORS  [ ] Fracture of the hip, pelvis, or leg                       (5 Points)  [ ] Spinal cord injury resulting in paralysis             (5 points)       (in the previous month)    [ ] Paralysis  (less than 1 month)                             (5 Points)  [ ] Multiple Trauma within 1 month                        (5 Points)    Total Score [        ]    Caprini Score 0-2: Low Risk, NO VTE prophylaxis required for most patients, encourage ambulation  Caprini Score 3-6: Moderate Risk , pharmacologic VTE prophylaxis is indicated for most patients (in the absence of contraindications)  Caprini Score Greater than or =7: High risk, pharmocologic VTE prophylaxis indicated for most patients (in the absence of contraindications)                              OPIOID RISK TOOL    NEERU EACH BOX THAT APPLIES AND ADD TOTALS AT THE END    FAMILY HISTORY OF SUBSTANCE ABUSE                 FEMALE         MALE                                                Alcohol                             [  ]1 pt          [  ]3pts                                               Illegal Durgs                     [  ]2 pts        [  ]3pts                                               Rx Drugs                           [  ]4 pts        [  ]4 pts    PERSONAL HISTORY OF SUBSTANCE ABUSE                                                                                          Alcohol                             [  ]3 pts       [  ]3 pts                                               Illegal Drugs                     [  ]4 pts        [  ]4 pts                                               Rx Drugs                           [  ]5 pts        [  ]5 pts    AGE BETWEEN 16-45 YEARS                                      [  ]1 pt         [  ]1 pt    HISTORY OF PREADOLESCENT   SEXUAL ABUSE                                                             [  ]3 pts        [  ]0pts    PSYCHOLOGICAL DISEASE                     ADD, OCD, Bipolar, Schizophrenia        [  ]2 pts         [  ]2 pts                      Depression                                               [  ]1 pt           [  ]1 pt           SCORING TOTAL   (add numbers and type here)              (***)                                     A score of 3 or lower indicated LOW risk for future opioid abuse  A score of 4 to 7 indicated moderate risk for future opioid abuse  A score of 8 or higher indicates a high risk for opioid abuse     52 year old female presents to [facility] Today. Patient has no significant PMH presents for nephrolithiasis. Patient reports she went to the [facility] ED 23 with left flank pain 10/10 in severity and describes the pain as constant and sharp. At the ED she had a CT showed 7mm L UVJ stone. Cr 1.16. UCx negative. On 23 had another CT showed stone still in L UVJ.  Today, patient denies pain, fever, chills, dysuria or any other related symptoms. Patient is scheduled for Left Ureteroscopy, Laser Lithotripsy and Possible Ureteral Stent on 23 with Dr. Khouri CAPRINI SCORE    AGE RELATED RISK FACTORS                                                             [ x] Age 41-60 years                                            (1 Point)  [ ] Age: 61-74 years                                           (2 Points)                 [ ] Age= 75 years                                                (3 Points)             DISEASE RELATED RISK FACTORS                                                       [ ] Edema in the lower extremities                 (1 Point)                     [ ] Varicose veins                                               (1 Point)                                 [ ] BMI > 25 Kg/m2                                            (1 Point)                                  [ ] Serious infection (ie PNA)                            (1 Point)                     [ ] Lung disease ( COPD, Emphysema)            (1 Point)                                                                          [ ] Acute myocardial infarction                         (1 Point)                  [ ] Congestive heart failure (in the previous month)  (1 Point)         [ ] Inflammatory bowel disease                            (1 Point)                  [ ] Central venous access, PICC or Port               (2 points)       (within the last month)                                                                [ ] Stroke (in the previous month)                        (5 Points)    [ ] Previous or present malignancy                       (2 points)                                                                                                                                                         HEMATOLOGY RELATED FACTORS                                                         [ ] Prior episodes of VTE                                     (3 Points)                     [ ] Positive family history for VTE                      (3 Points)                  [ ] Prothrombin 25665 A                                     (3 Points)                     [ ] Factor V Leiden                                                (3 Points)                        [ ] Lupus anticoagulants                                      (3 Points)                                                           [ ] Anticardiolipin antibodies                              (3 Points)                                                       [ ] High homocysteine in the blood                   (3 Points)                                             [ ] Other congenital or acquired thrombophilia      (3 Points)                                                [ ] Heparin induced thrombocytopenia                  (3 Points)                                        MOBILITY RELATED FACTORS  [ ] Bed rest                                                         (1 Point)  [ ] Plaster cast                                                    (2 points)  [ ] Bed bound for more than 72 hours           (2 Points)    GENDER SPECIFIC FACTORS  [ ] Pregnancy or had a baby within the last month   (1 Point)  [ ] Post-partum < 6 weeks                                   (1 Point)  [ ] Hormonal therapy  or oral contraception   (1 Point)  [ ] History of pregnancy complications              (1 point)  [ ] Unexplained or recurrent              (1 Point)    OTHER RISK FACTORS                                           (1 Point)  [ ] BMI >40, smoking, diabetes requiring insulin, chemotherapy  blood transfusions and length of surgery over 2 hours    SURGERY RELATED RISK FACTORS  [ ]  Section within the last month     (1 Point)  [ ] Minor surgery                                                  (1 Point)  [ ] Arthroscopic surgery                                       (2 Points)  [ ] Planned major surgery lasting more            (2 Points)   x   than 45 minutes     [ ] Elective hip or knee joint replacement       (5 points)       surgery                                                TRAUMA RELATED RISK FACTORS  [ ] Fracture of the hip, pelvis, or leg                       (5 Points)  [ ] Spinal cord injury resulting in paralysis             (5 points)       (in the previous month)    [ ] Paralysis  (less than 1 month)                             (5 Points)  [ ] Multiple Trauma within 1 month                        (5 Points)    Total Score [    3    ]    Caprini Score 0-2: Low Risk, NO VTE prophylaxis required for most patients, encourage ambulation  Caprini Score 3-6: Moderate Risk , pharmacologic VTE prophylaxis is indicated for most patients (in the absence of contraindications)  Caprini Score Greater than or =7: High risk, pharmocologic VTE prophylaxis indicated for most patients (in the absence of contraindications)    OPIOID RISK TOOL    NEERU EACH BOX THAT APPLIES AND ADD TOTALS AT THE END    FAMILY HISTORY OF SUBSTANCE ABUSE                 FEMALE         MALE                                                Alcohol                             [  ]1 pt          [  ]3pts                                               Illegal Durgs                     [  ]2 pts        [  ]3pts                                               Rx Drugs                           [  ]4 pts        [  ]4 pts    PERSONAL HISTORY OF SUBSTANCE ABUSE                                                                                          Alcohol                             [  ]3 pts       [  ]3 pts                                               Illegal Drugs                     [  ]4 pts        [  ]4 pts                                               Rx Drugs                           [  ]5 pts        [  ]5 pts    AGE BETWEEN 16-45 YEARS                                      [  ]1 pt         [  ]1 pt    HISTORY OF PREADOLESCENT   SEXUAL ABUSE                                                             [  ]3 pts        [  ]0pts    PSYCHOLOGICAL DISEASE                     ADD, OCD, Bipolar, Schizophrenia        [  ]2 pts         [  ]2 pts                      Depression                                               [  ]1 pt           [  ]1 pt           SCORING TOTAL   (add numbers and type here)              (*0**)                                     A score of 3 or lower indicated LOW risk for future opioid abuse  A score of 4 to 7 indicated moderate risk for future opioid abuse  A score of 8 or higher indicates a high risk for opioid abuse

## 2023-09-08 NOTE — H&P PST ADULT - HISTORY OF PRESENT ILLNESS
52F w no significant PMH presents for nephrolithiasis.    2021, bladder sling for PRISCILLA was successful.    8/16/23, CT showed 7mm L UVJ stone. Cr 1.16. UCx negative. Discharged for trial of passage w Flomax + Abx.    Yesterday, CT showed stone still in L UVJ.    Today, she reports that she has not had any pain since leaving the ED, but she has not seen the stone. This was her first stone. She does have a strong FMH of stones.  52 year old female presents to [facility] Today. Patient has no significant PMH presents for nephrolithiasis. Patient reports she went to the [facility] ED 8/16/23 with left flank pain 10/10 in severity and describes the pain as constant and sharp. At the ED she had a CT showed 7mm L UVJ stone. Cr 1.16. UCx negative. On 8/24/23 had another CT showed stone still in L UVJ.  Today, patient denies pain, fever, chills, dysuria or any other related symptoms. Patient is scheduled for Left Ureteroscopy, Laser Lithotripsy and Possible Ureteral Stent on 9/29/23 with Dr. Burnett

## 2023-09-08 NOTE — H&P PST ADULT - NSICDXPASTMEDICALHX_GEN_ALL_CORE_FT
PAST MEDICAL HISTORY:  Calculus of ureter      PAST MEDICAL HISTORY:  Calculus of ureter     Urinary, incontinence, stress female

## 2023-09-11 LAB
CULTURE RESULTS: SIGNIFICANT CHANGE UP
SPECIMEN SOURCE: SIGNIFICANT CHANGE UP

## 2023-09-13 PROBLEM — N39.3 STRESS INCONTINENCE (FEMALE) (MALE): Chronic | Status: ACTIVE | Noted: 2023-09-08

## 2023-09-13 PROBLEM — N20.1 CALCULUS OF URETER: Chronic | Status: ACTIVE | Noted: 2023-09-08

## 2023-09-22 ENCOUNTER — OUTPATIENT (OUTPATIENT)
Dept: OUTPATIENT SERVICES | Facility: HOSPITAL | Age: 53
LOS: 1 days | End: 2023-09-22
Payer: COMMERCIAL

## 2023-09-22 ENCOUNTER — APPOINTMENT (OUTPATIENT)
Dept: CT IMAGING | Facility: CLINIC | Age: 53
End: 2023-09-22
Payer: COMMERCIAL

## 2023-09-22 DIAGNOSIS — Z98.890 OTHER SPECIFIED POSTPROCEDURAL STATES: Chronic | ICD-10-CM

## 2023-09-22 DIAGNOSIS — N20.1 CALCULUS OF URETER: ICD-10-CM

## 2023-09-22 PROCEDURE — 72192 CT PELVIS W/O DYE: CPT

## 2023-09-22 PROCEDURE — 72192 CT PELVIS W/O DYE: CPT | Mod: 26

## 2023-09-23 RX ORDER — CEPHALEXIN 500 MG/1
500 CAPSULE ORAL 3 TIMES DAILY
Qty: 15 | Refills: 0 | Status: ACTIVE | COMMUNITY
Start: 2023-09-23 | End: 1900-01-01

## 2023-09-27 ENCOUNTER — TRANSCRIPTION ENCOUNTER (OUTPATIENT)
Age: 53
End: 2023-09-27

## 2023-09-28 ENCOUNTER — TRANSCRIPTION ENCOUNTER (OUTPATIENT)
Age: 53
End: 2023-09-28

## 2023-09-28 ENCOUNTER — OUTPATIENT (OUTPATIENT)
Dept: INPATIENT UNIT | Facility: HOSPITAL | Age: 53
LOS: 1 days | End: 2023-09-28
Payer: COMMERCIAL

## 2023-09-28 ENCOUNTER — APPOINTMENT (OUTPATIENT)
Dept: UROLOGY | Facility: HOSPITAL | Age: 53
End: 2023-09-28

## 2023-09-28 VITALS
OXYGEN SATURATION: 98 % | SYSTOLIC BLOOD PRESSURE: 122 MMHG | RESPIRATION RATE: 18 BRPM | TEMPERATURE: 97 F | HEART RATE: 66 BPM | DIASTOLIC BLOOD PRESSURE: 76 MMHG

## 2023-09-28 VITALS
TEMPERATURE: 98 F | SYSTOLIC BLOOD PRESSURE: 118 MMHG | OXYGEN SATURATION: 95 % | DIASTOLIC BLOOD PRESSURE: 64 MMHG | HEART RATE: 73 BPM | RESPIRATION RATE: 16 BRPM | HEIGHT: 66 IN | WEIGHT: 156.09 LBS

## 2023-09-28 DIAGNOSIS — Z98.890 OTHER SPECIFIED POSTPROCEDURAL STATES: Chronic | ICD-10-CM

## 2023-09-28 DIAGNOSIS — N20.1 CALCULUS OF URETER: ICD-10-CM

## 2023-09-28 PROCEDURE — C1889: CPT

## 2023-09-28 PROCEDURE — 52356 CYSTO/URETERO W/LITHOTRIPSY: CPT | Mod: LT

## 2023-09-28 PROCEDURE — C1758: CPT

## 2023-09-28 PROCEDURE — 52332 CYSTOSCOPY AND TREATMENT: CPT | Mod: LT

## 2023-09-28 PROCEDURE — C1769: CPT

## 2023-09-28 PROCEDURE — 88300 SURGICAL PATH GROSS: CPT

## 2023-09-28 PROCEDURE — 52352 CYSTOURETERO W/STONE REMOVE: CPT | Mod: LT

## 2023-09-28 PROCEDURE — 76000 FLUOROSCOPY <1 HR PHYS/QHP: CPT

## 2023-09-28 PROCEDURE — 82365 CALCULUS SPECTROSCOPY: CPT

## 2023-09-28 PROCEDURE — 88300 SURGICAL PATH GROSS: CPT | Mod: 26

## 2023-09-28 PROCEDURE — C2617: CPT

## 2023-09-28 DEVICE — LASER FIBER SOLTIVE 365: Type: IMPLANTABLE DEVICE | Site: LEFT | Status: FUNCTIONAL

## 2023-09-28 DEVICE — STONE BASKET ZEROTIP NITINOL 4-WIRE 1.9FR 120CM X 12MM: Type: IMPLANTABLE DEVICE | Site: LEFT | Status: FUNCTIONAL

## 2023-09-28 DEVICE — GUIDEWIRE SENSOR DUAL-FLEX NITINOL STRAIGHT .035" X 150CM: Type: IMPLANTABLE DEVICE | Site: LEFT | Status: FUNCTIONAL

## 2023-09-28 DEVICE — URETERAL CATH OPEN END FLEXI-TIP 5FR .038" X 70CM: Type: IMPLANTABLE DEVICE | Site: LEFT | Status: FUNCTIONAL

## 2023-09-28 DEVICE — URETERAL STENT CONTOUR 6FR 24CM: Type: IMPLANTABLE DEVICE | Site: LEFT | Status: FUNCTIONAL

## 2023-09-28 RX ORDER — ACETAMINOPHEN 500 MG
975 TABLET ORAL ONCE
Refills: 0 | Status: COMPLETED | OUTPATIENT
Start: 2023-09-28 | End: 2023-09-28

## 2023-09-28 RX ORDER — CEPHALEXIN 500 MG
1 CAPSULE ORAL
Refills: 0 | DISCHARGE

## 2023-09-28 RX ORDER — SODIUM CHLORIDE 9 MG/ML
3 INJECTION INTRAMUSCULAR; INTRAVENOUS; SUBCUTANEOUS ONCE
Refills: 0 | Status: DISCONTINUED | OUTPATIENT
Start: 2023-09-28 | End: 2023-09-28

## 2023-09-28 RX ORDER — IBUPROFEN 200 MG
3 TABLET ORAL
Qty: 0 | Refills: 0 | DISCHARGE

## 2023-09-28 RX ORDER — CEFAZOLIN SODIUM 1 G
2000 VIAL (EA) INJECTION ONCE
Refills: 0 | Status: DISCONTINUED | OUTPATIENT
Start: 2023-09-28 | End: 2023-09-28

## 2023-09-28 RX ORDER — OXYBUTYNIN CHLORIDE 5 MG
1 TABLET ORAL
Qty: 30 | Refills: 0
Start: 2023-09-28 | End: 2023-10-12

## 2023-09-28 RX ORDER — TAMSULOSIN HYDROCHLORIDE 0.4 MG/1
1 CAPSULE ORAL
Qty: 15 | Refills: 0
Start: 2023-09-28 | End: 2023-10-12

## 2023-09-28 RX ORDER — ACETAMINOPHEN 500 MG
2 TABLET ORAL
Qty: 0 | Refills: 0 | DISCHARGE

## 2023-09-28 RX ADMIN — Medication 975 MILLIGRAM(S): at 11:16

## 2023-09-28 NOTE — ASU DISCHARGE PLAN (ADULT/PEDIATRIC) - PROVIDER TOKENS
FREE:[LAST:[Lex],FIRST:[Bharat],PHONE:[phone]],FAX:[(   )    -],ADDRESS:[[address] [place] [address]]]

## 2023-09-28 NOTE — ASU DISCHARGE PLAN (ADULT/PEDIATRIC) - PATIENT BELONGINGS
Anesthesia Evaluation     Patient summary reviewed and Nursing notes reviewed                Airway   Mallampati: II  Dental      Pulmonary - negative pulmonary ROS   Cardiovascular - negative cardio ROS    Rhythm: regular  Rate: normal        Neuro/Psych  (+) psychiatric history Anxiety,     GI/Hepatic/Renal/Endo    (+) morbid obesity,      Musculoskeletal (-) negative ROS    Abdominal    Substance History - negative use     OB/GYN negative ob/gyn ROS         Other                        Anesthesia Plan    ASA 3     general   (BMI    CMAC available)  intravenous induction     Anesthetic plan, all risks, benefits, and alternatives have been provided, discussed and informed consent has been obtained with: patient.       Patient's belongings returned

## 2023-09-28 NOTE — BRIEF OPERATIVE NOTE - NSICDXBRIEFPROCEDURE_GEN_ALL_CORE_FT
PROCEDURES:  Left ureteroscopy with lithotripsy 28-Sep-2023 15:29:22  Bharat Burnett  Cystoscopic insertion of left ureteral stent 28-Sep-2023 15:29:35  Bharat Burnett

## 2023-09-28 NOTE — ASU DISCHARGE PLAN (ADULT/PEDIATRIC) - NS MD DC FALL RISK RISK
For information on Fall & Injury Prevention, visit: [email or web address] OR  [email or web address] OR  https://www.cdc.gov/steadi/patient.html

## 2023-09-28 NOTE — BRIEF OPERATIVE NOTE - OPERATION/FINDINGS
Left ureteral stone fragmented and basket extracted. 6F x 24cm JJ stent left in place w/o a dangler.

## 2023-10-02 LAB — SURGICAL PATHOLOGY STUDY: SIGNIFICANT CHANGE UP

## 2023-10-05 ENCOUNTER — APPOINTMENT (OUTPATIENT)
Dept: UROLOGY | Facility: CLINIC | Age: 53
End: 2023-10-05
Payer: COMMERCIAL

## 2023-10-05 VITALS
SYSTOLIC BLOOD PRESSURE: 93 MMHG | HEART RATE: 71 BPM | DIASTOLIC BLOOD PRESSURE: 56 MMHG | RESPIRATION RATE: 16 BRPM | OXYGEN SATURATION: 99 %

## 2023-10-05 LAB
BILIRUB UR QL STRIP: NORMAL
CLARITY UR: CLEAR
COLLECTION METHOD: NORMAL
GLUCOSE UR-MCNC: NORMAL
HCG UR QL: 0.2 EU/DL
HGB UR QL STRIP.AUTO: NORMAL
KETONES UR-MCNC: NORMAL
LEUKOCYTE ESTERASE UR QL STRIP: NORMAL
NITRITE UR QL STRIP: NORMAL
PH UR STRIP: 5.5
PROT UR STRIP-MCNC: NORMAL
SP GR UR STRIP: 1.02

## 2023-10-05 PROCEDURE — 81003 URINALYSIS AUTO W/O SCOPE: CPT | Mod: QW

## 2023-10-05 PROCEDURE — 52310 CYSTOSCOPY AND TREATMENT: CPT

## 2023-10-06 LAB
CELL MATERIAL STONE EST-MCNT: SIGNIFICANT CHANGE UP
LABORATORY COMMENT REPORT: SIGNIFICANT CHANGE UP
NIDUS STONE QN: SIGNIFICANT CHANGE UP

## 2023-12-07 ENCOUNTER — APPOINTMENT (OUTPATIENT)
Dept: UROLOGY | Facility: CLINIC | Age: 53
End: 2023-12-07

## 2024-08-26 RX ORDER — ONDANSETRON 2 MG/ML
4 INJECTION, SOLUTION INTRAMUSCULAR; INTRAVENOUS ONCE
Refills: 0 | Status: DISCONTINUED | OUTPATIENT
Start: 2024-08-27 | End: 2024-08-27

## 2024-08-26 RX ORDER — OXYCODONE HYDROCHLORIDE 5 MG/1
5 TABLET ORAL ONCE
Refills: 0 | Status: DISCONTINUED | OUTPATIENT
Start: 2024-08-27 | End: 2024-08-27

## 2024-08-26 RX ORDER — FENTANYL CITRATE 50 UG/ML
50 INJECTION INTRAMUSCULAR; INTRAVENOUS
Refills: 0 | Status: DISCONTINUED | OUTPATIENT
Start: 2024-08-27 | End: 2024-08-27

## 2024-08-27 ENCOUNTER — OUTPATIENT (OUTPATIENT)
Dept: INPATIENT UNIT | Facility: HOSPITAL | Age: 54
LOS: 1 days | Discharge: HOME | End: 2024-08-27
Payer: COMMERCIAL

## 2024-08-27 ENCOUNTER — TRANSCRIPTION ENCOUNTER (OUTPATIENT)
Age: 54
End: 2024-08-27

## 2024-08-27 VITALS
TEMPERATURE: 98 F | SYSTOLIC BLOOD PRESSURE: 108 MMHG | RESPIRATION RATE: 16 BRPM | DIASTOLIC BLOOD PRESSURE: 64 MMHG | HEART RATE: 68 BPM | OXYGEN SATURATION: 100 %

## 2024-08-27 VITALS
DIASTOLIC BLOOD PRESSURE: 66 MMHG | WEIGHT: 143.08 LBS | SYSTOLIC BLOOD PRESSURE: 110 MMHG | RESPIRATION RATE: 16 BRPM | OXYGEN SATURATION: 100 % | HEIGHT: 65 IN | TEMPERATURE: 98 F | HEART RATE: 66 BPM

## 2024-08-27 DIAGNOSIS — M54.6 PAIN IN THORACIC SPINE: ICD-10-CM

## 2024-08-27 DIAGNOSIS — Z98.890 OTHER SPECIFIED POSTPROCEDURAL STATES: Chronic | ICD-10-CM

## 2024-08-27 DIAGNOSIS — M54.2 CERVICALGIA: ICD-10-CM

## 2024-08-27 PROCEDURE — 88305 TISSUE EXAM BY PATHOLOGIST: CPT

## 2024-08-27 PROCEDURE — C9399: CPT

## 2024-08-27 PROCEDURE — 88305 TISSUE EXAM BY PATHOLOGIST: CPT | Mod: 26

## 2024-08-27 NOTE — ASU DISCHARGE PLAN (ADULT/PEDIATRIC) - ASU DC SPECIAL INSTRUCTIONSFT
Keep bra on at all times, you may remove 2 days post-op to sponge bath, you may wash bra and then reapply after and keep on at all times.   No heavy lifting >5lbs.  Walk often to prevent blood clots.   You may alternate tylenol and advil for pain control.   See attached references for drain care, you may empty drains every 8 hours and record output on the sheet.   Follow up in the office on Friday.   In case of emergency call [phone]. Keep bra on at all times, you may remove the bra 2 days post-op to sponge bath, you may wash the bra and then reapply it after and keep on at all times.   No heavy lifting >5lbs.  Walk often to prevent blood clots.   You may alternate tylenol and advil for pain control.   See attached references for drain care, you may empty drains every 8 hours and record output on the sheet.   Follow up in the office on Friday.   In case of emergency, call [phone].

## 2024-08-27 NOTE — ASU PATIENT PROFILE, ADULT - FALL HARM RISK - UNIVERSAL INTERVENTIONS
Bed in lowest position, wheels locked, appropriate side rails in place/Call bell, personal items and telephone in reach/Instruct patient to call for assistance before getting out of bed or chair/Non-slip footwear when patient is out of bed/[place] to call system/Physically safe environment - no spills, clutter or unnecessary equipment/Purposeful Proactive Rounding/Room/bathroom lighting operational, light cord in reach

## 2024-08-29 LAB — SURGICAL PATHOLOGY STUDY: SIGNIFICANT CHANGE UP

## 2024-09-01 DIAGNOSIS — N60.22 FIBROADENOSIS OF LEFT BREAST: ICD-10-CM

## 2024-09-01 DIAGNOSIS — N60.32 FIBROSCLEROSIS OF LEFT BREAST: ICD-10-CM

## 2024-09-01 DIAGNOSIS — N62 HYPERTROPHY OF BREAST: ICD-10-CM

## 2024-09-01 DIAGNOSIS — N60.31 FIBROSCLEROSIS OF RIGHT BREAST: ICD-10-CM

## 2025-06-26 NOTE — ASU DISCHARGE PLAN (ADULT/PEDIATRIC) - CARE PROVIDER_API CALL
Bharat Burnett  [address]  [place]  Phone: [phone]  Fax: (   )    -  Follow Up Time:   
Negative

## 2025-08-02 NOTE — H&P PST ADULT - NS HP PST LATEX ALLERGY
RENAL NOTE    REQUESTING PHYSICIAN: Dr Mueller     REASON FOR CONSULT: BRADLEY      ASSESSMENT/PLAN:  ESLD - acute decompensation with SBP  Chronic due to ETOH, reports abstinent since July 1, not candidate for Tx eval  Severe portal HTN, ascites and varices/ coagulopathy   Bili somewhat better today  No severe encephalopathy    SBP on rocephin, bcx and ascites cx pending.    BRADLEY, baseline normal renal fxn, bland UA last month and previously normal renal anatomy on imaging. Creat 0.8 7/17/25.   Non oliguric BRADLEY, severe volume overload, soft BP but no overt symptomatic hypotension, somewhat tense ascites.   Suspect HRS BRADLEY but also acute infx / SBP, tense ascites and HFmrHF contributing factors.   (UA bland, FENA and Kristie low c/w HRS BRADLEY/ prerenal physiology (CRS/ infx))  Midodrine and octreotide.  Creatinine up slightly today, by report UO is better but I:O inaccurate and weight only down 2 kg despite 6 liter LVP!!  Erazo for more accurate UO monitoring.  continue alb and iv bumex for diuresis, so far pretty diuretic resistant  ICU for vasopressor if worse hypotesion/ oliguria.     Severe hyponatremia, mild in past and now severe due to ADH activation and BRADLEY. 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     Coagulopathy INR 1.9    Ascites s/p LVP 6 liters    D/w pt, questions answered.  Will follow.   Prognosis guarded.     Yamilet De Jesus MD  [facility]  [phone]      HPI: 72 yo man admitted for 3rd time this month with complications of recent dx of alcoholic liver dz/ cirrhosis and ascites and varices. 3 liter tap on 7/16  and started diuretics, planned to see GI in Sept.   Unfortunately c/o worse weakness, abd  "distension and SOB.  Little appetite, no n/v, scant BRB with stooling ?from straining, no other gross GI bleed.   Urine \"dark\" no dysuria but \"void every time I stool\" and feels he empties.   No nsaids or OTC meds.  No ETOH since July 1.   No orthostatic sx.     , retired, from alcohol beverage industry, no hx tobacco    Today feels \"better\"   Abd distension less, s/p 6 liter LVP  No orthostatic sx  Aches from bed.  Good appetite  Had diarrhea overnoc, but \"better: after imodium  Voiding \"a lot more\" urinal and some incontinence.   No sob  No obvious bleeding.     REVIEW OF SYSTEMS:  COMPLETE REVIEW OF SYSTEMS: as above or was negative.     Past Medical History:   Diagnosis Date    Alcoholic cirrhosis of liver with ascites (H)     Esophageal varices (H)     Peripheral edema     Restless legs syndrome (RLS)        @[email or web address]@      ALLERGIES/SENSITIVITIES:  Allergies   Allergen Reactions    Penicillins Unknown    Sulfa Antibiotics Unknown     @SOCR(SUB)@  @[facility]@     PHYSICAL EXAM:  Physical Exam   Temp: 97.9  F (36.6  C) Temp src: Oral BP: 126/58 Pulse: 71   Resp: 16 SpO2: 97 % O2 Device: None (Room air)    Vitals:    08/01/25 0634 08/01/25 1609 08/02/25 0552   Weight: 101.6 kg (223 lb 14.4 oz) 108.6 kg (239 lb 6.4 oz) 106 kg (233 lb 11 oz)     Vital Signs with Ranges  Temp:  [97.5  F (36.4  C)-98  F (36.7  C)] 97.9  F (36.6  C)  Pulse:  [65-71] 71  Resp:  [16-20] 16  BP: ()/(46-58) 126/58  SpO2:  [97 %-98 %] 97 %  I/O last 3 completed shifts:  In: 760 [P.O.:760]  Out: 825 [Urine:825]    @TMAXR(24)@    Patient Vitals for the past 72 hrs:   Weight   08/02/25 0552 106 kg (233 lb 11 oz)   08/01/25 1609 108.6 kg (239 lb 6.4 oz)   08/01/25 0634 101.6 kg (223 lb 14.4 oz)   [unfilled]    General - A & O x 3, NAD jaundice   HEENT - +icteric sclerae   Neck supple    Respiratory - Lungs CTA bilat, but decreased at bases   Cardiovascular - AP RRR with murmur all precordium  Abdomen somewhat less but still large " / firm ascites, +abd wall pitting.   Extremities - well perfused, severe 3+ pitting extends to mid back level   Integumentary - intact, good turgor, no rash/lesions +deep jaundice   Neurologic - grossly intact no myoclonus  Psych:  Judgement intact, affect WNL  :  no madison.     Laboratory:     Recent Labs   Lab 08/02/25 0606 08/01/25 0656 07/31/25 2215   WBC 11.7* 13.3* 15.3*   RBC 3.06* 3.44* 3.58*   HGB 10.4* 11.6* 12.1*   HCT 28.4* 32.7* 33.9*    240 277       Basic Metabolic Panel:  Recent Labs   Lab 08/02/25  0606 08/02/25  0200 08/01/25  2223 08/01/25  1829 08/01/25  1419 08/01/25 0656 08/01/25  0152 07/31/25 2215   * 116* 116* 115* 115* 117*   < > 114*   POTASSIUM 4.0  --   --   --   --  4.7  --  5.3   CHLORIDE 85*  --   --   --   --  86*  --  86*   CO2 18*  --   --   --   --  15*  --  14*   BUN 79.2*  --   --   --   --  76.8*  --  76.1*   CR 4.04*  --   --   --   --  3.77*  --  3.89*   *  --   --   --   --  158*  --  190*   ANDRES 8.1*  --   --   --   --  8.5*  --  8.4*    < > = values in this interval not displayed.       INR  Recent Labs   Lab 08/01/25 0656 07/31/25 2215   INR 1.92* 1.91*       Recent Labs   Lab Test 08/01/25 0656 07/31/25 2215   POTASSIUM 4.7 5.3   CHLORIDE 86* 86*   BUN 76.8* 76.1*      Recent Labs   Lab Test 08/01/25 0656 07/31/25 2215 07/03/25  0635 07/02/25  1759   ALBUMIN 2.7* 2.5*   < >  --    BILITOTAL 28.2* 26.5*   < >  --    ALT 83* 84*   < >  --    * 135*   < >  --    PROTEIN  --   --   --  Negative    < > = values in this interval not displayed.     FENA 0.8% Kristie 20 Uosm 338  Personally reviewed today's laboratory studies      Thank you for involving us in the care of this patient. We will continue to follow along with you.      Yamilet De Jesus MD   [facility]  [phone]   No

## (undated) DEVICE — TUBING TUR 2 PRONG

## (undated) DEVICE — TUBING IRR SET FOR CYSTOSCOPY 77"

## (undated) DEVICE — PORT BIOPSY

## (undated) DEVICE — TUBING SUCTION 20FT

## (undated) DEVICE — GLV 7.5 PROTEXIS (WHITE)

## (undated) DEVICE — GOWN TRIMAX LG

## (undated) DEVICE — VISITEC 4X4

## (undated) DEVICE — DRAPE C ARM UNIVERSAL

## (undated) DEVICE — PRESSURE INFUSOR BAG 3000ML

## (undated) DEVICE — VENODYNE/SCD SLEEVE CALF MEDIUM

## (undated) DEVICE — WARMING BLANKET UPPER ADULT

## (undated) DEVICE — IRR BULB PATHFINDER + 10"

## (undated) DEVICE — SOL IRR BAG NS 0.9% 3000ML

## (undated) DEVICE — Device

## (undated) DEVICE — SOL IRR BAG H2O 3000ML

## (undated) DEVICE — PACK CYSTOSCOPY TIBURON